# Patient Record
Sex: MALE | Race: WHITE | NOT HISPANIC OR LATINO | Employment: FULL TIME | URBAN - NONMETROPOLITAN AREA
[De-identification: names, ages, dates, MRNs, and addresses within clinical notes are randomized per-mention and may not be internally consistent; named-entity substitution may affect disease eponyms.]

---

## 2023-05-05 LAB
ALANINE AMINOTRANSFERASE (SGPT) (U/L) IN SER/PLAS: 21 U/L (ref 10–52)
ALBUMIN (G/DL) IN SER/PLAS: 4.3 G/DL (ref 3.4–5)
ALKALINE PHOSPHATASE (U/L) IN SER/PLAS: 46 U/L (ref 33–120)
ANION GAP IN SER/PLAS: 12 MMOL/L (ref 10–20)
ASPARTATE AMINOTRANSFERASE (SGOT) (U/L) IN SER/PLAS: 17 U/L (ref 9–39)
BASOPHILS (10*3/UL) IN BLOOD BY AUTOMATED COUNT: 0.08 X10E9/L (ref 0–0.1)
BASOPHILS/100 LEUKOCYTES IN BLOOD BY AUTOMATED COUNT: 1.3 % (ref 0–2)
BILIRUBIN TOTAL (MG/DL) IN SER/PLAS: 0.9 MG/DL (ref 0–1.2)
CALCIUM (MG/DL) IN SER/PLAS: 9.4 MG/DL (ref 8.6–10.3)
CARBON DIOXIDE, TOTAL (MMOL/L) IN SER/PLAS: 25 MMOL/L (ref 21–32)
CHLORIDE (MMOL/L) IN SER/PLAS: 106 MMOL/L (ref 98–107)
CHOLESTEROL (MG/DL) IN SER/PLAS: 192 MG/DL (ref 0–199)
CHOLESTEROL IN HDL (MG/DL) IN SER/PLAS: 43 MG/DL
CHOLESTEROL/HDL RATIO: 4.5
CREATININE (MG/DL) IN SER/PLAS: 1.02 MG/DL (ref 0.5–1.3)
EOSINOPHILS (10*3/UL) IN BLOOD BY AUTOMATED COUNT: 0.08 X10E9/L (ref 0–0.7)
EOSINOPHILS/100 LEUKOCYTES IN BLOOD BY AUTOMATED COUNT: 1.3 % (ref 0–6)
ERYTHROCYTE DISTRIBUTION WIDTH (RATIO) BY AUTOMATED COUNT: 13.4 % (ref 11.5–14.5)
ERYTHROCYTE MEAN CORPUSCULAR HEMOGLOBIN CONCENTRATION (G/DL) BY AUTOMATED: 32 G/DL (ref 32–36)
ERYTHROCYTE MEAN CORPUSCULAR VOLUME (FL) BY AUTOMATED COUNT: 85 FL (ref 80–100)
ERYTHROCYTES (10*6/UL) IN BLOOD BY AUTOMATED COUNT: 5.1 X10E12/L (ref 4.5–5.9)
GFR MALE: >90 ML/MIN/1.73M2
GLUCOSE (MG/DL) IN SER/PLAS: 74 MG/DL (ref 74–99)
HEMATOCRIT (%) IN BLOOD BY AUTOMATED COUNT: 43.1 % (ref 41–52)
HEMOGLOBIN (G/DL) IN BLOOD: 13.8 G/DL (ref 13.5–17.5)
IMMATURE GRANULOCYTES/100 LEUKOCYTES IN BLOOD BY AUTOMATED COUNT: 0.3 % (ref 0–0.9)
LDL: 103 MG/DL (ref 0–99)
LEUKOCYTES (10*3/UL) IN BLOOD BY AUTOMATED COUNT: 6 X10E9/L (ref 4.4–11.3)
LYMPHOCYTES (10*3/UL) IN BLOOD BY AUTOMATED COUNT: 3 X10E9/L (ref 1.2–4.8)
LYMPHOCYTES/100 LEUKOCYTES IN BLOOD BY AUTOMATED COUNT: 49.8 % (ref 13–44)
MONOCYTES (10*3/UL) IN BLOOD BY AUTOMATED COUNT: 0.38 X10E9/L (ref 0.1–1)
MONOCYTES/100 LEUKOCYTES IN BLOOD BY AUTOMATED COUNT: 6.3 % (ref 2–10)
NEUTROPHILS (10*3/UL) IN BLOOD BY AUTOMATED COUNT: 2.47 X10E9/L (ref 1.2–7.7)
NEUTROPHILS/100 LEUKOCYTES IN BLOOD BY AUTOMATED COUNT: 41 % (ref 40–80)
NON HDL CHOLESTEROL: 149 MG/DL
PLATELETS (10*3/UL) IN BLOOD AUTOMATED COUNT: 286 X10E9/L (ref 150–450)
POTASSIUM (MMOL/L) IN SER/PLAS: 3.7 MMOL/L (ref 3.5–5.3)
PROTEIN TOTAL: 7 G/DL (ref 6.4–8.2)
SODIUM (MMOL/L) IN SER/PLAS: 139 MMOL/L (ref 136–145)
THYROTROPIN (MIU/L) IN SER/PLAS BY DETECTION LIMIT <= 0.05 MIU/L: 2.64 MIU/L (ref 0.44–3.98)
TRIGLYCERIDE (MG/DL) IN SER/PLAS: 231 MG/DL (ref 0–149)
UREA NITROGEN (MG/DL) IN SER/PLAS: 11 MG/DL (ref 6–23)
VLDL: 46 MG/DL (ref 0–40)

## 2024-01-05 ENCOUNTER — OFFICE VISIT (OUTPATIENT)
Dept: PRIMARY CARE | Facility: CLINIC | Age: 49
End: 2024-01-05
Payer: COMMERCIAL

## 2024-01-05 VITALS
DIASTOLIC BLOOD PRESSURE: 98 MMHG | HEART RATE: 71 BPM | HEIGHT: 74 IN | OXYGEN SATURATION: 96 % | BODY MASS INDEX: 29.57 KG/M2 | SYSTOLIC BLOOD PRESSURE: 132 MMHG | WEIGHT: 230.4 LBS

## 2024-01-05 DIAGNOSIS — Z00.00 HEALTHCARE MAINTENANCE: ICD-10-CM

## 2024-01-05 DIAGNOSIS — Z76.89 ENCOUNTER TO ESTABLISH CARE: Primary | ICD-10-CM

## 2024-01-05 PROCEDURE — 99203 OFFICE O/P NEW LOW 30 MIN: CPT | Performed by: STUDENT IN AN ORGANIZED HEALTH CARE EDUCATION/TRAINING PROGRAM

## 2024-01-05 PROCEDURE — 1036F TOBACCO NON-USER: CPT | Performed by: STUDENT IN AN ORGANIZED HEALTH CARE EDUCATION/TRAINING PROGRAM

## 2024-01-05 RX ORDER — KETOCONAZOLE 200 MG/1
TABLET ORAL
COMMUNITY
Start: 2023-06-01

## 2024-01-05 ASSESSMENT — PATIENT HEALTH QUESTIONNAIRE - PHQ9
SUM OF ALL RESPONSES TO PHQ9 QUESTIONS 1 AND 2: 0
2. FEELING DOWN, DEPRESSED OR HOPELESS: NOT AT ALL
1. LITTLE INTEREST OR PLEASURE IN DOING THINGS: NOT AT ALL

## 2024-01-05 NOTE — PROGRESS NOTES
Subjective   Patient ID: Steven Ely is a 48 y.o. male who presents for New PT (PT is here today to establish care as a new pt. Reports he has a sore foot occasionally and reports no injury. Started he thinks about a year ago. Denies imaging being done. ).    HPI    Here to establish care.     Foot pain: Reports pain in his foot after a days work. Can be near the heel. Does not have pain after waking up.  Location concerning for plantar fascitis vs spur. Recommended to try arch support.      Hx of Myocarditis ~10 years ago. No residual symptoms from that.     Fam hx of Diabetes.    Uses Ketoconazole intermittently for itnertrigo. Uses 1-2 times in summer.     BP elevated today but patient reports that BP was normal in the past. Denies chest pain, palpitations or shortness of breath.    Review of Systems  ROS negative except discussed above in HPI.    Vitals:    01/05/24 1628   BP: (!) 132/98   Pulse: 71   SpO2: 96%     Objective   Physical Exam  Constitutional:       Appearance: Normal appearance.   Cardiovascular:      Rate and Rhythm: Normal rate and regular rhythm.   Pulmonary:      Effort: Pulmonary effort is normal.      Breath sounds: Normal breath sounds.   Musculoskeletal:      Cervical back: Normal range of motion and neck supple.   Lymphadenopathy:      Cervical: No cervical adenopathy.   Neurological:      Mental Status: He is alert.           Assessment/Plan   Steven was seen today for new pt.  Diagnoses and all orders for this visit:  Encounter to establish care (Primary)  -     Lipid Panel; Future  -     Comprehensive Metabolic Panel; Future  -     CBC and Auto Differential; Future  -     TSH with reflex to Free T4 if abnormal; Future  Healthcare maintenance  -     Lipid Panel; Future  -     Comprehensive Metabolic Panel; Future  -     CBC and Auto Differential; Future  -     TSH with reflex to Free T4 if abnormal; Future      Follow up in 1 year. Sooner if needed.      Te Augustin,  MD MPH

## 2024-05-02 ENCOUNTER — OFFICE VISIT (OUTPATIENT)
Dept: URGENT CARE | Facility: CLINIC | Age: 49
End: 2024-05-02
Payer: COMMERCIAL

## 2024-05-02 VITALS
TEMPERATURE: 98 F | DIASTOLIC BLOOD PRESSURE: 90 MMHG | HEART RATE: 77 BPM | OXYGEN SATURATION: 97 % | WEIGHT: 220 LBS | SYSTOLIC BLOOD PRESSURE: 131 MMHG | RESPIRATION RATE: 18 BRPM | BODY MASS INDEX: 28.23 KG/M2 | HEIGHT: 74 IN

## 2024-05-02 DIAGNOSIS — M54.50 ACUTE LOW BACK PAIN WITHOUT SCIATICA, UNSPECIFIED BACK PAIN LATERALITY: Primary | ICD-10-CM

## 2024-05-02 PROCEDURE — 99213 OFFICE O/P EST LOW 20 MIN: CPT | Performed by: NURSE PRACTITIONER

## 2024-05-02 RX ORDER — CYCLOBENZAPRINE HCL 10 MG
10 TABLET ORAL EVERY 8 HOURS PRN
Qty: 21 TABLET | Refills: 0 | Status: SHIPPED | OUTPATIENT
Start: 2024-05-02

## 2024-05-02 RX ORDER — PREDNISONE 10 MG/1
TABLET ORAL
Qty: 21 TABLET | Refills: 0 | Status: SHIPPED | OUTPATIENT
Start: 2024-05-02

## 2024-05-02 NOTE — PROGRESS NOTES
"[facility]  Emi Pepe, APRN-CNP     Visit Note - 5/2/2024 4:42 PM   This note was generated with voice recognition software and may contain errors including spelling, grammar, syntax, and misrecognization of what was dictated.    Patient: Steven Ely, MRN: [id number], 48 y.o., male   PCP: Te Augustin MD MPH  ------------------------------------  ALLERGIES: No Known Allergies     CURRENT MEDICATIONS:   Current Outpatient Medications   Medication Instructions    cyclobenzaprine (FLEXERIL) 10 mg, oral, Every 8 hours PRN, *caution - can cause drowsiness*    ketoconazole (NIZOral) 200 mg tablet TAKE 2 TABLETS BY MOUTH FOR 1 DOSE, REPEAT IF NEEDED.    predniSONE (Deltasone) 10 mg tablet Take 6 tabs PO daily x1 day, then take 5 tabs daily x1 day, then take 4 tabs daily x1 day, then take 3 tabs daily x1 day, then take 2 tabs daily x1 day, then take 1 tab daily x1 day. Take with a meal.     ------------------------------------  PAST MEDICAL HX:  Patient Active Problem List   Diagnosis   (none) - all problems resolved or deleted      SURGICAL HX:  Past Surgical History:   Procedure Laterality Date    FINGER FRACTURE SURGERY        FAMILY HX:   No pertinent history.   SOCIAL HX:    reports that he has never smoked. He has never used smokeless tobacco. ; has a daughter. Is currently employed.   ------------------------------------  CHIEF COMPLAINT:   Chief Complaint   Patient presents with    Back Pain     Low back pain X 5 days after working outdoors       HISTORY OF PRESENT ILLNESS: The history was obtained from patient and his spouse. Steven is a 48 y.o. male, who presents with a chief complaint of low back pain that started on Friday AM. Reports he was outside cutting/splitting wood on Thursday and was working with a lot of heavy logs. He felt sore at the end of the day, but when he woke up on Friday morning, \"I could hardly move.\" Has a history of low back aches in the " "past, but they have not been this bothersome. He has had 4 chiropractic adjustments since Friday, and he feels he is \"about 50% better,\" but his chiropractor told him his back keeps shifting back out of alignment after the adjustments, so they recommended he see his PCP or an urgent care to discuss whether prednisone or a muscle relaxer might be helpful in his recovery. Reports had series of XR's at the chiropractor. Pain scale currently \"5-6/10\" and \"burning,\" but pt states has a high pain tolerance. Reports pain has been making his normal activities somewhat difficult - has to be very cautions with movements. Reports pain is exacerbated by moving/bending and sitting/standing in different positions, and some positions + laying seem to be helpful, at times. Denies any numbness/tingling/radiation of pain; denies any loss of bowel/bladder control. Denies any fever/chills, night sweats, rashes, weight loss, abdominal c/o, headaches, calf pain, respiratory issues, saddle anesthesia, and any other constitutional complaints. Has been taking ibuprofen and applying ice, without much relief. Denies any history of renal or hepatic issues; no history of GI issues.        REVIEW OF SYSTEMS:  10 systems reviewed negative with exception of history of present illness as listed above.    TODAY'S VITALS: /90   Pulse 77   Temp 36.7 °C (98 °F)   Resp 18   Ht 1.88 m (6' 2\")   Wt 99.8 kg (220 lb)   SpO2 97%   BMI 28.25 kg/m²     PHYSICAL EXAMINATION:  General:  Pleasant, well-nourished male; alert and oriented; appears uncomfortable and cautious with position changes and ambulation, but in no acute distress. Accompanied by his wife and daughter.   Neck:  Supple. No lymphadenopathy.  Respiratory:  Respirations easy and unlabored, Breath sounds equal. Lungs are clear to auscultation; no wheezes, rhonchi, or rales. Non-dyspneic.  Cardiovascular:  Normal rate, Regular rhythm. Normal S1S2. No m/r/g.  Gastrointestinal:  Soft, " "non-tender, non-distended. Bowel sounds normoactive.  Musculoskeletal:  Somewhat antalgic, cautious gait, and appears uncomfortable with position changes. No visible/palpable spinal curvature or step-offs. No tenderness to palpation over c-spine, thoracic spine, or lumbar spine, but describes pain \"inside\" over R lumbar paraspinals. Neg SLRs to 90 degrees bilat. 5/5 motor strength LE's (proximally and distally), n/v intact (proximally and distally). No saddle anesthesia. Patellar DTRs 2+ bilat. Has slightly limited lumbar flexion d/t moderate discomfort; has nearly full extension, and lat flexion and rotation bilat with mild discomfort to lumbar area. Able to perform full squat and heel/toe stand. C-spine ROM full, but flexion causes very slight \"pulling\" in lumbar (although he reports this is an improvement)..   Integumentary:  Pink, warm, dry, and Intact. No rashes or skin discoloration appreciated. Also has FROM shoulders without pain/limitation.   Neurologic:  Alert and oriented, grossly intact.  Cognition and Speech:  Oriented, Speech clear and coherent.    Psychiatric:  Cooperative, Appropriate mood & affect.       ------------------------------------  Medical Decision Making  LABORATORY or RADIOLOGICAL IMAGING ORDERS/RESULTS:   None.    IMPRESSION/PLAN:  Course: Worsening; stable    1. Acute low back pain without sciatica, unspecified back pain laterality  - predniSONE (Deltasone) 10 mg tablet; Take 6 tabs PO daily x1 day, then take 5 tabs daily x1 day, then take 4 tabs daily x1 day, then take 3 tabs daily x1 day, then take 2 tabs daily x1 day, then take 1 tab daily x1 day. Take with a meal.  Dispense: 21 tablet; Refill: 0  - cyclobenzaprine (Flexeril) 10 mg tablet; Take 1 tablet (10 mg) by mouth every 8 hours if needed (for muscle spasms/pain). *caution - can cause drowsiness*  Dispense: 21 tablet; Refill: 0    Symptoms consistent with lumbar strain without sciatica. No red flags or indications for " additional emergent imaging at this point (he already had XR's at chiropractor), but encouraged to start ice/heat for comfort, gentle stretching/ROM exercises as discomfort allows, and will start prednisone taper x 6 days to help decrease inflammation. Will also rx Flexeril for PRN use - urged caution - can cause drowsiness, and should not be taken with ETOH. Can use Tylenol as adjunct, but advised should avoid NSAIDs while taking prednisone. Encouraged to rest, and should use good body mechanics when sxs resolved and he resumes lifting. Reviewed red flags to monitor for, expectations for improvement in symptoms, counseled on potential adverse reactions of treatments, and advised to follow-up with primary care provider in 3-5 days if symptoms persist, or sooner if worsening or if any additional concerns develop. Patient agrees with plan of care; questions were encouraged and answered.       ANGELICA Bravo-CNP   Advanced Practice Provider  [facility]

## 2024-05-06 ENCOUNTER — TELEPHONE (OUTPATIENT)
Dept: PRIMARY CARE | Facility: CLINIC | Age: 49
End: 2024-05-06
Payer: COMMERCIAL

## 2024-05-06 DIAGNOSIS — Z12.11 ENCOUNTER FOR SCREENING FOR MALIGNANT NEOPLASM OF COLON: Primary | ICD-10-CM

## 2024-05-06 NOTE — TELEPHONE ENCOUNTER
Patient is wondering what days colonoscopies are done. I told him it depends on who we refer him to. He would like to get a referral for a colonoscopy.

## 2025-01-04 ENCOUNTER — LAB (OUTPATIENT)
Dept: LAB | Facility: LAB | Age: 50
End: 2025-01-04
Payer: COMMERCIAL

## 2025-01-04 DIAGNOSIS — Z76.89 ENCOUNTER TO ESTABLISH CARE: ICD-10-CM

## 2025-01-04 DIAGNOSIS — Z00.00 HEALTHCARE MAINTENANCE: ICD-10-CM

## 2025-01-04 LAB
ALBUMIN SERPL BCP-MCNC: 4.4 G/DL (ref 3.4–5)
ALP SERPL-CCNC: 48 U/L (ref 33–120)
ALT SERPL W P-5'-P-CCNC: 22 U/L (ref 10–52)
ANION GAP SERPL CALC-SCNC: 11 MMOL/L (ref 10–20)
AST SERPL W P-5'-P-CCNC: 19 U/L (ref 9–39)
BASOPHILS # BLD AUTO: 0.07 X10*3/UL (ref 0–0.1)
BASOPHILS NFR BLD AUTO: 1.2 %
BILIRUB SERPL-MCNC: 0.5 MG/DL (ref 0–1.2)
BUN SERPL-MCNC: 15 MG/DL (ref 6–23)
CALCIUM SERPL-MCNC: 9.4 MG/DL (ref 8.6–10.3)
CHLORIDE SERPL-SCNC: 107 MMOL/L (ref 98–107)
CHOLEST SERPL-MCNC: 209 MG/DL (ref 0–199)
CHOLESTEROL/HDL RATIO: 4.8
CO2 SERPL-SCNC: 27 MMOL/L (ref 21–32)
CREAT SERPL-MCNC: 0.96 MG/DL (ref 0.5–1.3)
EGFRCR SERPLBLD CKD-EPI 2021: >90 ML/MIN/1.73M*2
EOSINOPHIL # BLD AUTO: 0.2 X10*3/UL (ref 0–0.7)
EOSINOPHIL NFR BLD AUTO: 3.3 %
ERYTHROCYTE [DISTWIDTH] IN BLOOD BY AUTOMATED COUNT: 13.8 % (ref 11.5–14.5)
GLUCOSE SERPL-MCNC: 96 MG/DL (ref 74–99)
HCT VFR BLD AUTO: 44.4 % (ref 41–52)
HDLC SERPL-MCNC: 44 MG/DL
HGB BLD-MCNC: 14 G/DL (ref 13.5–17.5)
IMM GRANULOCYTES # BLD AUTO: 0.04 X10*3/UL (ref 0–0.7)
IMM GRANULOCYTES NFR BLD AUTO: 0.7 % (ref 0–0.9)
LDLC SERPL CALC-MCNC: 116 MG/DL
LYMPHOCYTES # BLD AUTO: 3.3 X10*3/UL (ref 1.2–4.8)
LYMPHOCYTES NFR BLD AUTO: 54.5 %
MCH RBC QN AUTO: 26.9 PG (ref 26–34)
MCHC RBC AUTO-ENTMCNC: 31.5 G/DL (ref 32–36)
MCV RBC AUTO: 85 FL (ref 80–100)
MONOCYTES # BLD AUTO: 0.44 X10*3/UL (ref 0.1–1)
MONOCYTES NFR BLD AUTO: 7.3 %
NEUTROPHILS # BLD AUTO: 2 X10*3/UL (ref 1.2–7.7)
NEUTROPHILS NFR BLD AUTO: 33 %
NON HDL CHOLESTEROL: 165 MG/DL (ref 0–149)
NRBC BLD-RTO: 0 /100 WBCS (ref 0–0)
PLATELET # BLD AUTO: 294 X10*3/UL (ref 150–450)
POTASSIUM SERPL-SCNC: 4.7 MMOL/L (ref 3.5–5.3)
PROT SERPL-MCNC: 6.5 G/DL (ref 6.4–8.2)
RBC # BLD AUTO: 5.2 X10*6/UL (ref 4.5–5.9)
SODIUM SERPL-SCNC: 140 MMOL/L (ref 136–145)
TRIGL SERPL-MCNC: 244 MG/DL (ref 0–149)
TSH SERPL-ACNC: 2.91 MIU/L (ref 0.44–3.98)
VLDL: 49 MG/DL (ref 0–40)
WBC # BLD AUTO: 6.1 X10*3/UL (ref 4.4–11.3)

## 2025-01-04 PROCEDURE — 80053 COMPREHEN METABOLIC PANEL: CPT

## 2025-01-04 PROCEDURE — 85025 COMPLETE CBC W/AUTO DIFF WBC: CPT

## 2025-01-04 PROCEDURE — 80061 LIPID PANEL: CPT

## 2025-01-04 PROCEDURE — 84443 ASSAY THYROID STIM HORMONE: CPT

## 2025-01-10 ENCOUNTER — APPOINTMENT (OUTPATIENT)
Dept: PRIMARY CARE | Facility: CLINIC | Age: 50
End: 2025-01-10
Payer: COMMERCIAL

## 2025-01-10 VITALS
HEART RATE: 74 BPM | DIASTOLIC BLOOD PRESSURE: 84 MMHG | WEIGHT: 229.6 LBS | OXYGEN SATURATION: 94 % | SYSTOLIC BLOOD PRESSURE: 130 MMHG | BODY MASS INDEX: 29.48 KG/M2

## 2025-01-10 DIAGNOSIS — Z12.11 COLON CANCER SCREENING: ICD-10-CM

## 2025-01-10 DIAGNOSIS — E78.00 HIGH CHOLESTEROL: Primary | ICD-10-CM

## 2025-01-10 DIAGNOSIS — Z12.5 ENCOUNTER FOR SCREENING FOR MALIGNANT NEOPLASM OF PROSTATE: ICD-10-CM

## 2025-01-10 DIAGNOSIS — M54.50 ACUTE LOW BACK PAIN WITHOUT SCIATICA, UNSPECIFIED BACK PAIN LATERALITY: ICD-10-CM

## 2025-01-10 DIAGNOSIS — Z00.00 HEALTHCARE MAINTENANCE: ICD-10-CM

## 2025-01-10 RX ORDER — CYCLOBENZAPRINE HCL 10 MG
10 TABLET ORAL EVERY 8 HOURS PRN
Qty: 21 TABLET | Refills: 0 | Status: SHIPPED | OUTPATIENT
Start: 2025-01-10

## 2025-01-10 NOTE — PROGRESS NOTES
Subjective   Patient ID: Steven Ely is a 49 y.o. male who presents for Annual Exam (Review lab results done on 1/4/25).    HPI   LAbs reviewed today normal TSH CBC CMP    Donates blood regular     Lipid borderline     Tries to eat low fat diet     Eats 2 apples and 2 oranges per day     More chicken than beef         Hx of Myocarditis 2014 . No residual symptoms from that.      Fam hx of Diabetes. Type I      Uses Ketoconazole intermittently for itnertrigo. Uses 1-2 times in summer.      BP good today has been borderline in the past     Uses elliptical for exercises     Uses spinner     Road bike and mountain        H/o lbp has seen chiropracter   Splits wood   Gets stiff and takes flexeril       In the afternoon gets HA   Ibuprofen usually helps  Post occipital bilateral     Review of Systems    Objective   /84   Pulse 74   Wt 104 kg (229 lb 9.6 oz)   SpO2 94%   BMI 29.48 kg/m²     Physical Exam  Constitutional:       Appearance: Normal appearance.   HENT:      Head: Normocephalic and atraumatic.   Eyes:      Conjunctiva/sclera: Conjunctivae normal.      Pupils: Pupils are equal, round, and reactive to light.   Cardiovascular:      Rate and Rhythm: Normal rate and regular rhythm.      Heart sounds: Normal heart sounds.   Pulmonary:      Effort: Pulmonary effort is normal.      Breath sounds: Normal breath sounds.   Lymphadenopathy:      Cervical: No cervical adenopathy.   Skin:     Coloration: Skin is not jaundiced.   Neurological:      General: No focal deficit present.      Mental Status: He is alert and oriented to person, place, and time.   Psychiatric:         Mood and Affect: Mood normal.         Behavior: Behavior normal.         Thought Content: Thought content normal.         Judgment: Judgment normal.         Assessment/Plan   Diagnoses and all orders for this visit:  High cholesterol  -     Basic Metabolic Panel; Future  -     Lipid Panel; Future  Acute low back pain without sciatica,  unspecified back pain laterality  -     cyclobenzaprine (Flexeril) 10 mg tablet; Take 1 tablet (10 mg) by mouth every 8 hours if needed (for muscle spasms/pain). *caution - can cause drowsiness*  Colon cancer screening  -     Cologuard® colon cancer screening; Future  Encounter for screening for malignant neoplasm of prostate  -     Prostate Specific Antigen, Screen; Future  Healthcare maintenance  -     Prostate Specific Antigen, Screen; Future

## 2025-02-05 LAB — NONINV COLON CA DNA+OCC BLD SCRN STL QL: NEGATIVE

## 2026-01-16 ENCOUNTER — APPOINTMENT (OUTPATIENT)
Dept: PRIMARY CARE | Facility: CLINIC | Age: 51
End: 2026-01-16
Payer: COMMERCIAL